# Patient Record
Sex: MALE | Race: WHITE
[De-identification: names, ages, dates, MRNs, and addresses within clinical notes are randomized per-mention and may not be internally consistent; named-entity substitution may affect disease eponyms.]

---

## 2018-04-05 ENCOUNTER — HOSPITAL ENCOUNTER (INPATIENT)
Dept: HOSPITAL 74 - YASAS | Age: 48
LOS: 4 days | Discharge: HOME | DRG: 773 | End: 2018-04-09
Attending: INTERNAL MEDICINE | Admitting: INTERNAL MEDICINE
Payer: COMMERCIAL

## 2018-04-05 VITALS — BODY MASS INDEX: 26.2 KG/M2

## 2018-04-05 DIAGNOSIS — Z91.5: ICD-10-CM

## 2018-04-05 DIAGNOSIS — F11.20: Primary | ICD-10-CM

## 2018-04-05 DIAGNOSIS — F25.9: ICD-10-CM

## 2018-04-05 DIAGNOSIS — F14.20: ICD-10-CM

## 2018-04-05 DIAGNOSIS — E86.0: ICD-10-CM

## 2018-04-05 DIAGNOSIS — F31.9: ICD-10-CM

## 2018-04-05 DIAGNOSIS — B18.2: ICD-10-CM

## 2018-04-05 DIAGNOSIS — Z86.69: ICD-10-CM

## 2018-04-05 DIAGNOSIS — F12.20: ICD-10-CM

## 2018-04-05 DIAGNOSIS — F13.230: ICD-10-CM

## 2018-04-05 DIAGNOSIS — F10.230: ICD-10-CM

## 2018-04-05 LAB
APPEARANCE UR: (no result)
BILIRUB UR STRIP.AUTO-MCNC: NEGATIVE MG/DL
COLOR UR: (no result)
EPITH CASTS URNS QL MICRO: (no result) /HPF
KETONES UR QL STRIP: NEGATIVE
LEUKOCYTE ESTERASE UR QL STRIP.AUTO: (no result)
MUCOUS THREADS URNS QL MICRO: (no result)
NITRITE UR QL STRIP: NEGATIVE
PH UR: 5 [PH] (ref 5–8)
PROT UR QL STRIP: NEGATIVE
PROT UR QL STRIP: NEGATIVE
RBC # UR STRIP: NEGATIVE /UL
SP GR UR: 1.02 (ref 1–1.03)
UROBILINOGEN UR STRIP-MCNC: (no result) MG/DL (ref 0.2–1)

## 2018-04-05 PROCEDURE — HZ2ZZZZ DETOXIFICATION SERVICES FOR SUBSTANCE ABUSE TREATMENT: ICD-10-PCS | Performed by: INTERNAL MEDICINE

## 2018-04-05 RX ADMIN — NICOTINE SCH: 14 PATCH, EXTENDED RELEASE TRANSDERMAL at 16:17

## 2018-04-05 RX ADMIN — Medication PRN MG: at 22:44

## 2018-04-05 RX ADMIN — Medication SCH MG: at 22:44

## 2018-04-05 NOTE — HP
CIWA Score





- CIWA Score


Nausea/Vomiting: 3


Muscle Tremors: 4-Moderate,w/Arms Extend


Anxiety: 3


Agitation: 3


Paroxysmal Sweats: No Perspiration


Orientation: 0-Oriented


Tacttile Disturbances: 0-None


Auditory Disturbances: 0-None


Visual Disturbances: 0-None


Headache: 0-None Present


CIWA-Ar Total Score: 13





Admission [facility]





- [facility]


Chief Complaint: 





alcohol and benzodiazepine withdrawal sx


Allergies/Adverse Reactions: 


 Allergies











Allergy/AdvReac Type Severity Reaction Status Date / Time


 


No Known Allergies Allergy   Verified 18 11:55











History of Present Illness: 





47 yo m with h/o OUD, severe on otp 205mg daily ldm today was sent for 

inaptietn detoxification form alchol and benzoidazepines because of withdrwal 

sx when he does not use and h/o seizures, no h/o DTs or ROMERO.  PMHX anxiety, 

depressio and insomna , thristy. pateit is nodding off on current methadone 

dose of 205, will decrease dose by 10% to 180 while being detoxed with 

benzoidazepiens becasue of syneergistic effect. 


Exam Limitations: No Limitations





- Ebola screening


Have you traveled outside of the country in the last 21 days: No


Have you had contact with anyone from an Ebola affected area: No


Have you been sick,other than usual withdrawal symptoms: Yes


Do you have a fever: No





- Review of Systems


Constitutional: Chills, Diaphoresis, Night Sweats, Changes in sleep, Weight 

Stable


EENT: reports: No Symptoms Reported


Respiratory: reports: No Symptoms reported


Cardiac: reports: No Symptoms Reported


GI: reports: Diarrhea, Nausea, Poor Appetite, Poor Fluid Intake, Indigestion, 

Abdominal cramping


: reports: No Symptoms Reported


Musculoskeletal: reports: No Symptoms Reported


Integumentary: reports: Flushing, Sweating


Endocrine: reports: Increased Thirst


Hematology: reports: No Symptoms Reported


Psychiatric: reports: Judgement Intact, Mood/Affect Appropiate, Orientated x3, 

Anxious, Depressed


Other Systems: Reviewed and Negative





Patient History





- Patient Medical History


Hx Anemia: No


Hx Asthma: No


Hx Chronic Obstructive Pulmonary Disease (COPD): No


Hx Cancer: No


Hx Cardiac Disorders: No


Hx Congestive Heart Failure: No


Hx Hypertension: Yes (not taking meds)


Hx Hypercholesterolemia: Yes (not taking meds)


Hx Pacemaker: No


HX Cerebrovascular Accident: No


Hx Seizures: Yes (r/t head trauma-last episode was in , withdrawal seziures 

as well)


Hx Dementia: No


Hx Diabetes: Yes (currently not on treatment)


Hx Gastrointestinal Disorders: No


Hx Liver Disease: No


Hx Genitourinary Disorders: No


Hx Sexually Transmitted Disorders: No


Hx Renal Disease (ESRD): No


Hx Thyroid Disease: No


Hx Human Immunodeficiency Virus (HIV): No


Hx Hepatitis C: Yes (untreated)


Hx Depression: Yes


Hx Suicide Attempt: Yes (cut right wrist at age 21, si at this time)


Hx Bipolar Disorder: Yes (no meds)


Hx Schizophrenia: No





- Patient Surgical History


Past Surgical History: No


Hx Neurologic Surgery: No


Hx Cataract Extraction: No


Hx Cardiac Surgery: No


Hx Lung Surgery: No


Hx Breast Surgery: No


Hx Breast Biopsy: No


Hx Abdominal Surgery: No


Hx Appendectomy: No


Hx Cholecystectomy: No


Hx Genitourinary Surgery: No


Hx  Section: No


Hx Orthopedic Surgery: No


Anesthesia Reaction: No





- PPD History


Previous Implant?: Yes


Documented Results: Negative w/o proof


Implanted On Prior [facility] Admission?: No


PPD to be Administered?: Yes





- Reproductive History


Patient is a Female of Child Bearing Age (11 -55 yrs old): No


Patient Pregnant: No





- Smoking Cessation


Smoking history: Current every day smoker


Have you smoked in the past 12 months: Yes


Aproximately how many cigarettes per day: 10


Hx Chewing Tobacco Use: No


Initiated information on smoking cessation: Yes


'Breaking Loose' booklet given: 18





- Substance & Tx. History


Hx Alcohol Use: Yes


Hx Substance Use: Yes


Substance Use Type: Alcohol, Cocaine, Heroin, Marijuana, Opiates, Prescribed, 

Tranquilizers


Hx Substance Use Treatment: Yes (MMTP< first admission to [facility])





- Substances Abused


  ** Cocaine


Route: Injection


Frequency: Daily


Amount used: $300


Age of first use: 17


Date of Last Use: 18





  ** Alcohol-vodka/rum/beer


Route: Oral


Frequency: Daily


Amount used: 3 pts./3-6 pks.


Age of first use: 17


Date of Last Use: 18





  ** Xanax


Route: Oral


Frequency: Daily


Amount used: 4 mg.


Age of first use: 46


Date of Last Use: 18





Family Disease History





- Family Disease History


Family History: Denies





Admission Physical Exam [facility]





- Vital Signs


Vital Signs: 


 Vital Signs - 24 hr











  18





  10:43


 


Temperature 97.6 F


 


Pulse Rate 72


 


Respiratory 18





Rate 


 


Blood Pressure 123/83














- Physical


General Appearance: Yes: Nourished, Appropriately Dressed, Disheveled, Mild 

Distress, Thin, Tremorous, Irritable, Sweating, Anxious


HEENTM: Yes: Within Normal Limits, EOMI, Hearing grossly Normal, Normal ENT 

Inspection, Normocephalic, Normal Voice, BARTOLOME, Pharynx Normal


Respiratory: Yes: Within Normal Limits, Chest Non-Tender, Lungs Clear, Normal 

Breath Sounds, No Respiratory Distress, No Accessory Muscle Use


Neck: Yes: Within Normal Limits, No masses,lesions,Nodules, Supple, Trachea in 

good position


Breast: Yes: Breast Exam Deferred


Cardiology: Yes: Within Normal Limits, Regular Rhythm, Regular Rate, S1, S2


Abdominal: Yes: Within Normal Limits, Normal Bowel Sounds, Non Tender, Flat, 

Soft, Increased Bowel Sounds


Genitourinary: Yes: Within Normal Limits


Back: Yes: Within Normal Limits, Normal Inspection


Musculoskeletal: Yes: full range of Motion, Gait Steady, Pelvis Stable, Back 

pain, Joint Stiffness


Extremities: Yes: Normal Capillary Refill, Normal Range of Motion, Non-Tender, 

Tremors


Neurological: Yes: CNs II-XII NML intact, Fully Oriented, Alert, Motor Strength 

5/5, Normal Response, Depressed Affect


Integumentary: Yes: Normal Color, Warm, Diaphoresis, Moist, Track Marks


Lymphatic: Yes: Within Normal Limits





- Addiitonal


Findings: 





withdrawal sx





- Diagnostic


(1) Alcohol dependence with uncomplicated withdrawal


Current Visit: Yes   Status: Acute   





(2) Sedative, hypnotic or anxiolytic dependence with withdrawal, uncomplicated


Current Visit: Yes   Status: Acute   





(3) Opioid dependence on agonist therapy


Current Visit: Yes   Status: Acute   





(4) Intravenous drug user


Current Visit: Yes   Status: Acute   





(5) Hepatitis C


Current Visit: Yes   Status: Acute   





(6) Depression


Current Visit: Yes   Status: Acute   





(7) Dehydration


Current Visit: Yes   Status: Acute   





Cleared for Admission [facility]





- Detox or Rehab


[facility] Level of Care: Medically Managed


Detox Regimen/Protocol: Valium





BHS Breath Alcohol Content


Breath Alcohol Content: 0





Urine Drug Screen





- Results


Drug Screen Negative: No


Urine Drug Screen Results: THC-Marijuana, JAVED-Cocaine, BZO-Benzodiazepines, MTD-

Methadone, TCA-Tricyclic Antidepress

## 2018-04-05 NOTE — PN
BHS Progress Note


Note: 





Psychiatric nurse practitioner note:


Writer attempted to see patient for psychiatric consultation but patient was 

sedated and unable to answer questions. Psychiatric consultation deferred.

## 2018-04-06 LAB
ALBUMIN SERPL-MCNC: 3.6 G/DL (ref 3.4–5)
ALP SERPL-CCNC: 57 U/L (ref 45–117)
ALT SERPL-CCNC: 29 U/L (ref 12–78)
ANION GAP SERPL CALC-SCNC: 7 MMOL/L (ref 8–16)
AST SERPL-CCNC: 22 U/L (ref 15–37)
BILIRUB SERPL-MCNC: 0.2 MG/DL (ref 0.2–1)
BUN SERPL-MCNC: 12 MG/DL (ref 7–18)
CALCIUM SERPL-MCNC: 8.5 MG/DL (ref 8.5–10.1)
CHLORIDE SERPL-SCNC: 103 MMOL/L (ref 98–107)
CO2 SERPL-SCNC: 33 MMOL/L (ref 21–32)
CREAT SERPL-MCNC: 0.8 MG/DL (ref 0.7–1.3)
DEPRECATED RDW RBC AUTO: 13.1 % (ref 11.9–15.9)
GLUCOSE SERPL-MCNC: 109 MG/DL (ref 74–106)
HCT VFR BLD CALC: 39 % (ref 35.4–49)
HGB BLD-MCNC: 13.2 GM/DL (ref 11.7–16.9)
MCH RBC QN AUTO: 31.5 PG (ref 25.7–33.7)
MCHC RBC AUTO-ENTMCNC: 33.8 G/DL (ref 32–35.9)
MCV RBC: 93.2 FL (ref 80–96)
PLATELET # BLD AUTO: 178 K/MM3 (ref 134–434)
PMV BLD: 10.9 FL (ref 7.5–11.1)
POTASSIUM SERPLBLD-SCNC: 4 MMOL/L (ref 3.5–5.1)
PROT SERPL-MCNC: 6.9 G/DL (ref 6.4–8.2)
RBC # BLD AUTO: 4.19 M/MM3 (ref 4–5.6)
SODIUM SERPL-SCNC: 143 MMOL/L (ref 136–145)
WBC # BLD AUTO: 7.9 K/MM3 (ref 4–10)

## 2018-04-06 RX ADMIN — Medication SCH TAB: at 10:41

## 2018-04-06 RX ADMIN — LIDOCAINE SCH PATCH: 50 PATCH TOPICAL at 10:41

## 2018-04-06 RX ADMIN — Medication PRN MG: at 22:43

## 2018-04-06 RX ADMIN — HYDROXYZINE PAMOATE PRN MG: 25 CAPSULE ORAL at 17:38

## 2018-04-06 RX ADMIN — NICOTINE SCH MG: 14 PATCH, EXTENDED RELEASE TRANSDERMAL at 10:41

## 2018-04-06 RX ADMIN — Medication SCH: at 22:43

## 2018-04-06 RX ADMIN — Medication SCH MG: at 22:43

## 2018-04-06 RX ADMIN — METHADONE HYDROCHLORIDE SCH MG: 40 TABLET ORAL at 06:00

## 2018-04-06 NOTE — EKG
Test Reason : 

Blood Pressure : ***/*** mmHG

Vent. Rate : 062 BPM     Atrial Rate : 062 BPM

   P-R Int : 158 ms          QRS Dur : 094 ms

    QT Int : 472 ms       P-R-T Axes : 054 059 064 degrees

   QTc Int : 479 ms

 

NORMAL SINUS RHYTHM

NORMAL ECG

NO PREVIOUS ECGS AVAILABLE

Confirmed by CA MANCIA MD ([id number]) on 4/6/2018 10:07:30 AM

 

Referred By:             Confirmed By:CA MANCIA MD

## 2018-04-06 NOTE — PN
S CIWA





- CIWA Score


Nausea/Vomiting: 3


Muscle Tremors: 3


Anxiety: 3


Agitation: 1-Slight > Activity


Paroxysmal Sweats: 3


Orientation: 0-Oriented


Tacttile Disturbances: 0-None


Auditory Disturbances: 0-None


Visual Disturbances: 2-Mild Sensitivity


Headache: 3-Moderate


CIWA-Ar Total Score: 18





BHS Progress Note (SOAP)


Subjective: 





Nausea, Diarrhea, Sweating, Tremors, H/A, Anxious, Stomach Cramping, Body Aches.


Objective: 


PATIENT A & O X 3, OBSERVED AMBULATING ON UNIT. NO ACUTE DISTRESS.





04/06/18 13:27


 Vital Signs











Temperature  98.6 F   04/06/18 09:33


 


Pulse Rate  84   04/06/18 09:33


 


Respiratory Rate  18   04/06/18 09:33


 


Blood Pressure  117/74   04/06/18 09:33


 


O2 Sat by Pulse Oximetry (%)      








 Laboratory Tests











  04/05/18 04/05/18 04/06/18





  12:28 19:00 05:50


 


WBC    7.9


 


RBC    4.19


 


Hgb    13.2


 


Hct    39.0


 


MCV    93.2


 


MCH    31.5


 


MCHC    33.8


 


RDW    13.1


 


Plt Count    178


 


MPV    10.9


 


Sodium   


 


Potassium   


 


Chloride   


 


Carbon Dioxide   


 


Anion Gap   


 


BUN   


 


Creatinine   


 


Creat Clearance w eGFR   


 


POC Glucometer  118  


 


Random Glucose   


 


Calcium   


 


Total Bilirubin   


 


AST   


 


ALT   


 


Alkaline Phosphatase   


 


Total Protein   


 


Albumin   


 


Urine Color   Aleah 


 


Urine Appearance   Turbid 


 


Urine pH   5.0 


 


Ur Specific Gravity   1.024 


 


Urine Protein   Negative 


 


Urine Glucose (UA)   Negative 


 


Urine Ketones   Negative 


 


Urine Blood   Negative 


 


Urine Nitrite   Negative 


 


Urine Bilirubin   Negative 


 


Urine Urobilinogen   4.0 e.u/dl 


 


Ur Leukocyte Esterase   Trace 


 


Urine WBC (Auto)   None 


 


Urine RBC (Auto)   None 


 


Ur Epithelial Cells   Rare 


 


Urine Mucus   Many 














  04/06/18 04/06/18





  05:50 06:02


 


WBC  


 


RBC  


 


Hgb  


 


Hct  


 


MCV  


 


MCH  


 


MCHC  


 


RDW  


 


Plt Count  


 


MPV  


 


Sodium  143 


 


Potassium  4.0 


 


Chloride  103 


 


Carbon Dioxide  33 H 


 


Anion Gap  7 L 


 


BUN  12 


 


Creatinine  0.8 


 


Creat Clearance w eGFR  > 60 


 


POC Glucometer   129


 


Random Glucose  109 H 


 


Calcium  8.5 


 


Total Bilirubin  0.2 


 


AST  22 


 


ALT  29 


 


Alkaline Phosphatase  57 


 


Total Protein  6.9 


 


Albumin  3.6 


 


Urine Color  


 


Urine Appearance  


 


Urine pH  


 


Ur Specific Gravity  


 


Urine Protein  


 


Urine Glucose (UA)  


 


Urine Ketones  


 


Urine Blood  


 


Urine Nitrite  


 


Urine Bilirubin  


 


Urine Urobilinogen  


 


Ur Leukocyte Esterase  


 


Urine WBC (Auto)  


 


Urine RBC (Auto)  


 


Ur Epithelial Cells  


 


Urine Mucus  








LABS NOTED.


RPR, HIV AB RESULTS PENDING.


04/06/18 13:28





Assessment: 





04/06/18 13:27


WITHDRAWAL SYMPTOMS.


Plan: 





CONTINUE DETOX.


INCREASE DAILY PO FLUID INTAKE.


PRN IMMODIUM FOR DIARRHEA.

## 2018-04-06 NOTE — CONSULT
BHS Psychiatric Consult





- Data


Date of interview: 04/06/18


Admission source: [facility]


Identifying data: First admission to [facility] for this 49 y/o  male 

seeking detox treatment on [address] for alcohol,xanax,marihuana,cocaine and 

opiod dependence.Patient is ,a father of five,homeless,unemployed and 

supported on SSI benefits.


Substance Abuse History: Confirmed by the patient in this interview.Details in 

current BHS report : Smoking history: Current every day smoker.  Have you 

smoked in the past 12 months: Yes.  Aproximately how many cigarettes per day: 

10.  Hx Chewing Tobacco Use: No.  Initiated information on smoking cessation: 

Yes.  'Breaking Loose' booklet given: 04/05/18.  - Substance & Tx. History.  Hx 

Alcohol Use: Yes.  Hx Substance Use: Yes.  Substance Use Type: Alcohol, Cocaine

, Heroin, Marijuana, Opiates, Prescribed, Tranquilizers.  Hx Substance Use 

Treatment: Yes (MMTP< first admission to [facility]).  - Substances Abused.  ** 

Cocaine.  Route: Injection.  Frequency: Daily.  Amount used: $300.  Age of 

first use: 17.  Date of Last Use: 04/05/18.  ** Alcohol-vodka/rum/beer.  Route: 

Oral.  Frequency: Daily.  Amount used: 3 pts./3-6 pks.  Age of first use: 17.  

Date of Last Use: 04/04/18.  ** Xanax.  Route: Oral.  Frequency: Daily.  Amount 

used: 4 mg.  Age of first use: 46.  Date of Last Use: 04/05/18


Medical History: Diabetes mellitus,dyslipidemia,antecedent of head trauma and 

seizures,hepatitis C (untreated) and hypertension.No reported allergies.


Psychiatric History: Patient admits to a history of 3-5 psychiatric 

hospitalizations ([facility]).Early onset of mental 

illness (age 11).Diagnosed with Schizophrenia.Mr Park reports that he gets his 

psychiatric outpatient services at the [facility] clinic on [address] [facility] in 

the [place].Patient is maintained on a regimen of lexapro 20 mg/day + trilafon 24 

mg po bid (confirmed by pharmacy claims of 3/29/18 @ [facility]).Recently 

discharged from an inpatient psychiatric service.Three months ago,as per self-

report.Patient admits to a remote history os suicide attempt (ag 21) via self-

mutilation (wrist-cutting).Currently on methadone maintenance (205 mg/day).Dose 

has been lowered to 180 mg/day since admission to [address] [place] [address] due to oversedation.


Physical/Sexual Abuse/Trauma History: Patient denies history of abuse.


Additional Comment: Urine Drug Screen Results: THC-Marijuana, JAVED-Cocaine, BZO-

Benzodiazepines, MTD-Methadone, TCA-Tricyclic Antidepressant.Noted.





Mental Status Exam





- Mental Status Exam


Alert and Oriented to: Time, Place, Person


Cognitive Function: Grossly Intact


Patient Appearance: Disheveled


Mood: Nervous, Withdrawn


Affect: Mood Congruent, Constricted


Patient Behavior: Fatigued, Talkative, Cooperative


Speech Pattern: Clear (slow,spontaneous)


Voice Loudness: Normal


Thought Process: Goal Oriented


Thought Disorder: Not Present


Hallucinations: Denies


Suicidal Ideation: Denies


Homicidal Ideation: Denies


Insight/Judgement: Poor


Sleep: Well


Appetite: Good


Muscle strength/Tone: Normal


Gait/Station: Normal (observed ambulatiing on the unit)





Psychiatric Findings





- Problem List (Axis 1, 2,3)


(1) Schizophrenia


Current Visit: Yes   Status: Chronic   





(2) Alcohol dependence with uncomplicated withdrawal


Current Visit: Yes   Status: Acute   





(3) Opioid dependence on agonist therapy


Current Visit: Yes   Status: Acute   





(4) Sedative, hypnotic or anxiolytic dependence with withdrawal, uncomplicated


Current Visit: Yes   Status: Acute   





(5) Cannabis dependence


Current Visit: Yes   Status: Acute   





(6) Cocaine dependence


Current Visit: Yes   Status: Acute   





- Initial Treatment Plan


Initial Treatment Plan: Psychoeducation.Sleep hygiene.Detoxification in 

progress.Will hold trilafon for another 24 hours and observe.If further 

improvement of cognition in AM,will resume trilafon at 8 mg po bid + lexapro 20 

mg po daily.Discussed with the patient.Mr Park has expressed his agreement to 

this plan of care.Observation.

## 2018-04-07 RX ADMIN — Medication SCH EACH: at 22:43

## 2018-04-07 RX ADMIN — Medication SCH MG: at 22:43

## 2018-04-07 RX ADMIN — NICOTINE SCH MG: 14 PATCH, EXTENDED RELEASE TRANSDERMAL at 10:32

## 2018-04-07 RX ADMIN — LIDOCAINE SCH PATCH: 50 PATCH TOPICAL at 10:32

## 2018-04-07 RX ADMIN — Medication PRN MG: at 22:45

## 2018-04-07 RX ADMIN — METHADONE HYDROCHLORIDE SCH MG: 40 TABLET ORAL at 05:30

## 2018-04-07 RX ADMIN — Medication SCH TAB: at 10:32

## 2018-04-07 NOTE — PN
S CIWA





- CIWA Score


Nausea/Vomitin


Muscle Tremors: 3


Anxiety: 2


Agitation: 0-Normal Activity


Paroxysmal Sweats: 3


Orientation: 2-Disoriented Date<2 days


Tacttile Disturbances: 1-Very Mild Itch/Numbness


Auditory Disturbances: 0-None


Visual Disturbances: 1-Very Mild Sensitivity


Headache: 0-None Present


CIWA-Ar Total Score: 17





BHS Progress Note (SOAP)


Subjective: 





Vomiting, H/A, Stomach Cramping, Sweating, Tremors, Fatigue, Diarrhea.


Objective: 


PATIENT A & O X 2 (UNCERTAIN ABOUT CURRENT DAY / DATE). NO ACUTE DISTRESS.





18 15:00


 Vital Signs











Temperature  97.0 F L  18 13:14


 


Pulse Rate  85   18 13:14


 


Respiratory Rate  18   18 13:14


 


Blood Pressure  103/71   18 13:14


 


O2 Sat by Pulse Oximetry (%)      








 Laboratory Tests











  18





  12:15 12:28 19:00


 


WBC   


 


RBC   


 


Hgb   


 


Hct   


 


MCV   


 


MCH   


 


MCHC   


 


RDW   


 


Plt Count   


 


MPV   


 


Sodium   


 


Potassium   


 


Chloride   


 


Carbon Dioxide   


 


Anion Gap   


 


BUN   


 


Creatinine   


 


Creat Clearance w eGFR   


 


POC Glucometer   118 


 


Random Glucose   


 


Calcium   


 


Total Bilirubin   


 


AST   


 


ALT   


 


Alkaline Phosphatase   


 


Total Protein   


 


Albumin   


 


Urine Color    Aleah


 


Urine Appearance    Turbid


 


Urine pH    5.0


 


Ur Specific Gravity    1.024


 


Urine Protein    Negative


 


Urine Glucose (UA)    Negative


 


Urine Ketones    Negative


 


Urine Blood    Negative


 


Urine Nitrite    Negative


 


Urine Bilirubin    Negative


 


Urine Urobilinogen    4.0 e.u/dl


 


Ur Leukocyte Esterase    Trace


 


Urine WBC (Auto)    None


 


Urine RBC (Auto)    None


 


Ur Epithelial Cells    Rare


 


Urine Mucus    Many


 


RPR Titer   


 


HIV 1&2 Antibody Screen  Negative  


 


HIV P24 Antigen  Negative  














  18





  05:50 05:50 05:50


 


WBC  7.9  


 


RBC  4.19  


 


Hgb  13.2  


 


Hct  39.0  


 


MCV  93.2  


 


MCH  31.5  


 


MCHC  33.8  


 


RDW  13.1  


 


Plt Count  178  


 


MPV  10.9  


 


Sodium   143 


 


Potassium   4.0 


 


Chloride   103 


 


Carbon Dioxide   33 H 


 


Anion Gap   7 L 


 


BUN   12 


 


Creatinine   0.8 


 


Creat Clearance w eGFR   > 60 


 


POC Glucometer   


 


Random Glucose   109 H 


 


Calcium   8.5 


 


Total Bilirubin   0.2 


 


AST   22 


 


ALT   29 


 


Alkaline Phosphatase   57 


 


Total Protein   6.9 


 


Albumin   3.6 


 


Urine Color   


 


Urine Appearance   


 


Urine pH   


 


Ur Specific Gravity   


 


Urine Protein   


 


Urine Glucose (UA)   


 


Urine Ketones   


 


Urine Blood   


 


Urine Nitrite   


 


Urine Bilirubin   


 


Urine Urobilinogen   


 


Ur Leukocyte Esterase   


 


Urine WBC (Auto)   


 


Urine RBC (Auto)   


 


Ur Epithelial Cells   


 


Urine Mucus   


 


RPR Titer    Nonreactive


 


HIV 1&2 Antibody Screen   


 


HIV P24 Antigen   














  18





  06:02


 


WBC 


 


RBC 


 


Hgb 


 


Hct 


 


MCV 


 


MCH 


 


MCHC 


 


RDW 


 


Plt Count 


 


MPV 


 


Sodium 


 


Potassium 


 


Chloride 


 


Carbon Dioxide 


 


Anion Gap 


 


BUN 


 


Creatinine 


 


Creat Clearance w eGFR 


 


POC Glucometer  129


 


Random Glucose 


 


Calcium 


 


Total Bilirubin 


 


AST 


 


ALT 


 


Alkaline Phosphatase 


 


Total Protein 


 


Albumin 


 


Urine Color 


 


Urine Appearance 


 


Urine pH 


 


Ur Specific Gravity 


 


Urine Protein 


 


Urine Glucose (UA) 


 


Urine Ketones 


 


Urine Blood 


 


Urine Nitrite 


 


Urine Bilirubin 


 


Urine Urobilinogen 


 


Ur Leukocyte Esterase 


 


Urine WBC (Auto) 


 


Urine RBC (Auto) 


 


Ur Epithelial Cells 


 


Urine Mucus 


 


RPR Titer 


 


HIV 1&2 Antibody Screen 


 


HIV P24 Antigen 








LABS NOTED.


Assessment: 





18 15:00


WITHDRAWAL SYMPTOMS.


Plan: 





CONTINUE DETOX.


PRN TIGAN IM FOR VOMITING.


ENCOURAGE AMBULATION.


INCREASE DAILY PO FLUID INTAKE.

## 2018-04-08 RX ADMIN — LIDOCAINE SCH PATCH: 50 PATCH TOPICAL at 10:39

## 2018-04-08 RX ADMIN — Medication SCH MG: at 22:41

## 2018-04-08 RX ADMIN — HYDROXYZINE PAMOATE PRN MG: 25 CAPSULE ORAL at 15:05

## 2018-04-08 RX ADMIN — METHADONE HYDROCHLORIDE SCH MG: 40 TABLET ORAL at 05:42

## 2018-04-08 RX ADMIN — Medication SCH EACH: at 22:41

## 2018-04-08 RX ADMIN — NICOTINE SCH MG: 14 PATCH, EXTENDED RELEASE TRANSDERMAL at 10:39

## 2018-04-08 RX ADMIN — Medication SCH TAB: at 10:39

## 2018-04-08 NOTE — PN
BHS Progress Note (SOAP)


Subjective: 





Chills, sweating, back pain, stomach ache


Objective: 





04/08/18 16:07


 Last Vital Signs











Temp Pulse Resp BP Pulse Ox


 


 96 F L  80   20   101/72    


 


 04/08/18 14:35  04/08/18 14:35  04/08/18 14:35  04/08/18 14:35   








 Laboratory Tests











  04/05/18 04/05/18 04/05/18





  12:15 12:28 19:00


 


WBC   


 


RBC   


 


Hgb   


 


Hct   


 


MCV   


 


MCH   


 


MCHC   


 


RDW   


 


Plt Count   


 


MPV   


 


Sodium   


 


Potassium   


 


Chloride   


 


Carbon Dioxide   


 


Anion Gap   


 


BUN   


 


Creatinine   


 


Creat Clearance w eGFR   


 


POC Glucometer   118 


 


Random Glucose   


 


Calcium   


 


Total Bilirubin   


 


AST   


 


ALT   


 


Alkaline Phosphatase   


 


Total Protein   


 


Albumin   


 


Urine Color    Aleah


 


Urine Appearance    Turbid


 


Urine pH    5.0


 


Ur Specific Gravity    1.024


 


Urine Protein    Negative


 


Urine Glucose (UA)    Negative


 


Urine Ketones    Negative


 


Urine Blood    Negative


 


Urine Nitrite    Negative


 


Urine Bilirubin    Negative


 


Urine Urobilinogen    4.0 e.u/dl


 


Ur Leukocyte Esterase    Trace


 


Urine WBC (Auto)    None


 


Urine RBC (Auto)    None


 


Ur Epithelial Cells    Rare


 


Urine Mucus    Many


 


RPR Titer   


 


HIV 1&2 Antibody Screen  Negative  


 


HIV P24 Antigen  Negative  














  04/06/18 04/06/18 04/06/18





  05:50 05:50 05:50


 


WBC  7.9  


 


RBC  4.19  


 


Hgb  13.2  


 


Hct  39.0  


 


MCV  93.2  


 


MCH  31.5  


 


MCHC  33.8  


 


RDW  13.1  


 


Plt Count  178  


 


MPV  10.9  


 


Sodium   143 


 


Potassium   4.0 


 


Chloride   103 


 


Carbon Dioxide   33 H 


 


Anion Gap   7 L 


 


BUN   12 


 


Creatinine   0.8 


 


Creat Clearance w eGFR   > 60 


 


POC Glucometer   


 


Random Glucose   109 H 


 


Calcium   8.5 


 


Total Bilirubin   0.2 


 


AST   22 


 


ALT   29 


 


Alkaline Phosphatase   57 


 


Total Protein   6.9 


 


Albumin   3.6 


 


Urine Color   


 


Urine Appearance   


 


Urine pH   


 


Ur Specific Gravity   


 


Urine Protein   


 


Urine Glucose (UA)   


 


Urine Ketones   


 


Urine Blood   


 


Urine Nitrite   


 


Urine Bilirubin   


 


Urine Urobilinogen   


 


Ur Leukocyte Esterase   


 


Urine WBC (Auto)   


 


Urine RBC (Auto)   


 


Ur Epithelial Cells   


 


Urine Mucus   


 


RPR Titer    Nonreactive


 


HIV 1&2 Antibody Screen   


 


HIV P24 Antigen   














  04/06/18





  06:02


 


WBC 


 


RBC 


 


Hgb 


 


Hct 


 


MCV 


 


MCH 


 


MCHC 


 


RDW 


 


Plt Count 


 


MPV 


 


Sodium 


 


Potassium 


 


Chloride 


 


Carbon Dioxide 


 


Anion Gap 


 


BUN 


 


Creatinine 


 


Creat Clearance w eGFR 


 


POC Glucometer  129


 


Random Glucose 


 


Calcium 


 


Total Bilirubin 


 


AST 


 


ALT 


 


Alkaline Phosphatase 


 


Total Protein 


 


Albumin 


 


Urine Color 


 


Urine Appearance 


 


Urine pH 


 


Ur Specific Gravity 


 


Urine Protein 


 


Urine Glucose (UA) 


 


Urine Ketones 


 


Urine Blood 


 


Urine Nitrite 


 


Urine Bilirubin 


 


Urine Urobilinogen 


 


Ur Leukocyte Esterase 


 


Urine WBC (Auto) 


 


Urine RBC (Auto) 


 


Ur Epithelial Cells 


 


Urine Mucus 


 


RPR Titer 


 


HIV 1&2 Antibody Screen 


 


HIV P24 Antigen 








Labs reviewed


Assessment: 





04/08/18 16:08


Withdrawal symptoms


Plan: 





Continue detox


Encouraged to drink more water for hydration

## 2018-04-09 VITALS — SYSTOLIC BLOOD PRESSURE: 101 MMHG | HEART RATE: 67 BPM | TEMPERATURE: 96 F | DIASTOLIC BLOOD PRESSURE: 68 MMHG

## 2018-04-09 RX ADMIN — Medication SCH TAB: at 10:22

## 2018-04-09 RX ADMIN — METHADONE HYDROCHLORIDE SCH MG: 40 TABLET ORAL at 05:46

## 2018-04-09 RX ADMIN — NICOTINE SCH MG: 14 PATCH, EXTENDED RELEASE TRANSDERMAL at 10:22

## 2018-04-09 RX ADMIN — HYDROXYZINE PAMOATE PRN MG: 25 CAPSULE ORAL at 01:43

## 2018-04-09 RX ADMIN — LIDOCAINE SCH PATCH: 50 PATCH TOPICAL at 10:22

## 2018-04-09 NOTE — PN
[facility] Progress Note (SOAP)


Subjective: 





DETOX COMPLETED, ALERT O X 3. NAD.


Objective: 





04/09/18 11:56


 Vital Signs











Temperature  96.0 F L  04/09/18 09:29


 


Pulse Rate  67   04/09/18 09:29


 


Respiratory Rate  18   04/09/18 09:29


 


Blood Pressure  101/68   04/09/18 09:29


 


O2 Sat by Pulse Oximetry (%)      








 Laboratory Last Values











WBC  7.9 K/mm3 (4.0-10.0)   04/06/18  05:50    


 


RBC  4.19 M/mm3 (4.00-5.60)   04/06/18  05:50    


 


Hgb  13.2 GM/dL (11.7-16.9)   04/06/18  05:50    


 


Hct  39.0 % (35.4-49)   04/06/18  05:50    


 


MCV  93.2 fl (80-96)   04/06/18  05:50    


 


MCH  31.5 pg (25.7-33.7)   04/06/18  05:50    


 


MCHC  33.8 g/dl (32.0-35.9)   04/06/18  05:50    


 


RDW  13.1 % (11.9-15.9)   04/06/18  05:50    


 


Plt Count  178 K/MM3 (134-434)   04/06/18  05:50    


 


MPV  10.9 fl (7.5-11.1)   04/06/18  05:50    


 


Sodium  143 mmol/L (136-145)   04/06/18  05:50    


 


Potassium  4.0 mmol/L (3.5-5.1)   04/06/18  05:50    


 


Chloride  103 mmol/L ()   04/06/18  05:50    


 


Carbon Dioxide  33 mmol/L (21-32)  H  04/06/18  05:50    


 


Anion Gap  7  (8-16)  L  04/06/18  05:50    


 


BUN  12 mg/dL (7-18)   04/06/18  05:50    


 


Creatinine  0.8 mg/dL (0.7-1.3)   04/06/18  05:50    


 


Creat Clearance w eGFR  > 60  (>60)   04/06/18  05:50    


 


POC Glucometer  129 UNITS ()   04/06/18  06:02    


 


Random Glucose  109 mg/dL ()  H  04/06/18  05:50    


 


Calcium  8.5 mg/dL (8.5-10.1)   04/06/18  05:50    


 


Total Bilirubin  0.2 mg/dL (0.2-1.0)   04/06/18  05:50    


 


AST  22 U/L (15-37)   04/06/18  05:50    


 


ALT  29 U/L (12-78)   04/06/18  05:50    


 


Alkaline Phosphatase  57 U/L ()   04/06/18  05:50    


 


Total Protein  6.9 g/dl (6.4-8.2)   04/06/18  05:50    


 


Albumin  3.6 g/dl (3.4-5.0)   04/06/18  05:50    


 


Urine Color  Aleah   04/05/18  19:00    


 


Urine Appearance  Turbid   04/05/18  19:00    


 


Urine pH  5.0  (5.0-8.0)   04/05/18  19:00    


 


Ur Specific Gravity  1.024  (1.001-1.035)   04/05/18  19:00    


 


Urine Protein  Negative  (NEGATIVE)   04/05/18  19:00    


 


Urine Glucose (UA)  Negative  (NEGATIVE)   04/05/18  19:00    


 


Urine Ketones  Negative  (NEGATIVE)   04/05/18  19:00    


 


Urine Blood  Negative  (NEGATIVE)   04/05/18  19:00    


 


Urine Nitrite  Negative  (NEGATIVE)   04/05/18  19:00    


 


Urine Bilirubin  Negative  (<2.0 mg/dL)   04/05/18  19:00    


 


Urine Urobilinogen  4.0 e.u/dl mg/dL (0.2-1.0)   04/05/18  19:00    


 


Ur Leukocyte Esterase  Trace  (NEGATIVE)   04/05/18  19:00    


 


Urine WBC (Auto)  None /hpf (3-5)   04/05/18  19:00    


 


Urine RBC (Auto)  None /hpf (0-3)   04/05/18  19:00    


 


Ur Epithelial Cells  Rare /HPF (FEW)   04/05/18  19:00    


 


Urine Mucus  Many   04/05/18  19:00    


 


RPR Titer  Nonreactive  (NONREACTIVE)   04/06/18  05:50    


 


HIV 1&2 Antibody Screen  Negative   04/05/18  12:15    


 


HIV P24 Antigen  Negative   04/05/18  12:15    











Assessment: 





04/09/18 11:56


NAD








Plan: 





D/C PT TODAY

## 2018-05-24 ENCOUNTER — HOSPITAL ENCOUNTER (INPATIENT)
Dept: HOSPITAL 74 - YASAS | Age: 48
LOS: 20 days | Discharge: HOME | DRG: 772 | End: 2018-06-13
Attending: PSYCHIATRY & NEUROLOGY | Admitting: PSYCHIATRY & NEUROLOGY
Payer: COMMERCIAL

## 2018-05-24 VITALS — BODY MASS INDEX: 25 KG/M2

## 2018-05-24 DIAGNOSIS — E11.65: ICD-10-CM

## 2018-05-24 DIAGNOSIS — M79.672: ICD-10-CM

## 2018-05-24 DIAGNOSIS — F17.210: ICD-10-CM

## 2018-05-24 DIAGNOSIS — F12.20: ICD-10-CM

## 2018-05-24 DIAGNOSIS — B18.2: ICD-10-CM

## 2018-05-24 DIAGNOSIS — F20.9: ICD-10-CM

## 2018-05-24 DIAGNOSIS — F14.20: Primary | ICD-10-CM

## 2018-05-24 DIAGNOSIS — Z86.69: ICD-10-CM

## 2018-05-24 DIAGNOSIS — Z91.5: ICD-10-CM

## 2018-05-24 DIAGNOSIS — F11.20: ICD-10-CM

## 2018-05-24 PROCEDURE — HZ42ZZZ GROUP COUNSELING FOR SUBSTANCE ABUSE TREATMENT, COGNITIVE-BEHAVIORAL: ICD-10-PCS | Performed by: PSYCHIATRY & NEUROLOGY

## 2018-05-24 RX ADMIN — Medication SCH MG: at 21:32

## 2018-05-24 RX ADMIN — Medication PRN MG: at 21:34

## 2018-05-24 RX ADMIN — HYDROXYZINE PAMOATE PRN MG: 50 CAPSULE ORAL at 21:34

## 2018-05-24 NOTE — HP
Admission [facility]





- [facility]


Chief Complaint: 





I need to get my life back. 


Allergies/Adverse Reactions: 


 Allergies











Allergy/AdvReac Type Severity Reaction Status Date / Time


 


No Known Allergies Allergy   Verified 18 17:44











History of Present Illness: 





pt is a 48yr old male with a history of crack/cocaine. Pt is on MMTP program 

last dose received today with 185mg. Pending verification. 


Exam Limitations: No Limitations





- Ebola screening


Have you traveled outside of the country in the last 21 days: No


Have you had contact with anyone from an Ebola affected area: No


Have you been sick,other than usual withdrawal symptoms: No


Do you have a fever: No





- Review of Systems


Constitutional: Chills


EENT: reports: No Symptoms Reported


Respiratory: reports: No Symptoms reported


Cardiac: reports: No Symptoms Reported


GI: reports: Poor Appetite, Poor Fluid Intake


: reports: No Symptoms Reported


Integumentary: reports: Flushing, Sweating


Endocrine: reports: Excessive Sweating, Flushing, Intolerance to Cold, 

Intolerance to Heat


Hematology: reports: No Symptoms Reported


Psychiatric: reports: Judgement Intact, Mood/Affect Appropiate, Orientated x3, 

Agitated, Anxious


Other Systems: Reviewed and Negative





Patient History





- Patient Medical History


Hx Anemia: No


Hx Asthma: No


Hx Chronic Obstructive Pulmonary Disease (COPD): No


Hx Cancer: No


Hx Cardiac Disorders: No


Hx Congestive Heart Failure: No


Hx Hypertension: Yes (not taking meds)


Hx Hypercholesterolemia: Yes (not taking meds)


Hx Pacemaker: No


HX Cerebrovascular Accident: No


Hx Seizures: Yes (r/t head trauma-last episode was in , withdrawal seziures 

as well)


Hx Dementia: No


Hx Diabetes: Yes (currently not on treatment)


Hx Gastrointestinal Disorders: No


Hx Liver Disease: No


Hx Genitourinary Disorders: No


Hx Sexually Transmitted Disorders: No


Hx Renal Disease (ESRD): No


Hx Thyroid Disease: No


Hx Human Immunodeficiency Virus (HIV): No


Hx Hepatitis C: Yes (untreated)


Hx Depression: Yes


Hx Suicide Attempt: Yes (cut right wrist at age 21, si at this time)


Hx Bipolar Disorder: Yes (no meds)


Hx Schizophrenia: No





- Patient Surgical History


Past Surgical History: No


Hx Neurologic Surgery: No


Hx Cataract Extraction: No


Hx Cardiac Surgery: No


Hx Lung Surgery: No


Hx Breast Surgery: No


Hx Breast Biopsy: No


Hx Abdominal Surgery: No


Hx Appendectomy: No


Hx Cholecystectomy: No


Hx Genitourinary Surgery: No


Hx  Section: No


Hx Orthopedic Surgery: No


Anesthesia Reaction: No





- PPD History


Previous Implant?: Yes


Documented Results: Negative w/proof


Date: 18


PPD to be Administered?: No





- Reproductive History


Patient is a Female of Child Bearing Age (11 -55 yrs old): No





- Smoking Cessation


Smoking history: Current every day smoker


Have you smoked in the past 12 months: Yes


Aproximately how many cigarettes per day: 10


Hx Chewing Tobacco Use: No


Initiated information on smoking cessation: Yes


'Breaking Loose' booklet given: 18





- Substance & Tx. History


Hx Alcohol Use: No


Hx Substance Use: Yes


Substance Use Type: Cocaine


Hx Substance Use Treatment: Yes (last detox 2018)





- Substances Abused


  ** Crack


Route: Smoking


Frequency: Daily


Amount used: $50


Age of first use: 14


Date of Last Use: 18





Family Disease History





- Family Disease History


Family History: Denies





Admission Physical Exam [facility]





- Vital Signs


Vital Signs: 


 Vital Signs - 24 hr











  18





  17:17


 


Temperature 98 F


 


Pulse Rate 63


 


Respiratory 20





Rate 


 


Blood Pressure 137/88














- Physical


General Appearance: Yes: Appropriately Dressed, Moderate Distress, Sweating, 

Anxious


HEENTM: Yes: Hearing grossly Normal, Normal Voice


Respiratory: Yes: Lungs Clear, Normal Breath Sounds, No Respiratory Distress


Neck: Yes: No masses,lesions,Nodules


Breast: Yes: Within Normal Limits


Cardiology: Yes: Regular Rhythm, Regular Rate, S1, S2


Abdominal: Yes: Normal Bowel Sounds, Non Tender, Flat


Genitourinary: Yes: Within Normal Limits


Back: Yes: Normal Inspection


Musculoskeletal: Yes: full range of Motion


Extremities: Yes: Normal Capillary Refill, Tremors


Neurological: Yes: Fully Oriented, Alert, Normal Response


Integumentary: Yes: Normal Color


Lymphatic: Yes: Within Normal Limits





- Diagnostic


(1) Methadone maintenance therapy patient


Current Visit: Yes   Status: Chronic   





(2) Cannabis dependence


Current Visit: Yes   Status: Chronic   





(3) Nicotine dependence


Current Visit: Yes   Status: Chronic   


Qualifiers: 


   Nicotine product type: cigarettes   Substance use status: uncomplicated   

Qualified Code(s): F17.210 - Nicotine dependence, cigarettes, uncomplicated   





(4) Cocaine dependence


Current Visit: Yes   Status: Chronic   


Qualifiers: 


   Substance use status: uncomplicated 





(5) Hepatitis C


Current Visit: Yes   Status: Chronic   


Qualifiers: 


   Viral hepatitis chronicity: chronic   Hepatic coma status: without hepatic 

coma   Qualified Code(s): B18.2 - Chronic viral hepatitis C   





Cleared for Admission S





- Detox or Rehab


[facility] Level of Care: Medically Managed





[facility] Breath Alcohol Content


Breath Alcohol Content: 0





Urine Drug Screen





- Results


Drug Screen Negative: No


Urine Drug Screen Results: THC-Marijuana, JAVED-Cocaine, BZO-Benzodiazepines





Inpatient Rehab Admission





- Initial Determination


Are CD services needed?: Yes


Free of communicable disease: Yes


Not in need of hospitalization: Yes





- Rehab Admission Criteria


Previous failed treatment: Yes


Comorbidities: Yes


Lacks judgement: Yes

## 2018-05-25 LAB
ALBUMIN SERPL-MCNC: 3.4 G/DL (ref 3.4–5)
ALP SERPL-CCNC: 56 U/L (ref 45–117)
ALT SERPL-CCNC: 19 U/L (ref 12–78)
ANION GAP SERPL CALC-SCNC: 7 MMOL/L (ref 8–16)
AST SERPL-CCNC: 20 U/L (ref 15–37)
BILIRUB SERPL-MCNC: 0.5 MG/DL (ref 0.2–1)
BUN SERPL-MCNC: 14 MG/DL (ref 7–18)
CALCIUM SERPL-MCNC: 9 MG/DL (ref 8.5–10.1)
CHLORIDE SERPL-SCNC: 103 MMOL/L (ref 98–107)
CO2 SERPL-SCNC: 32 MMOL/L (ref 21–32)
CREAT SERPL-MCNC: 0.9 MG/DL (ref 0.7–1.3)
DEPRECATED RDW RBC AUTO: 13.4 % (ref 11.9–15.9)
GLUCOSE SERPL-MCNC: 152 MG/DL (ref 74–106)
HCT VFR BLD CALC: 40.7 % (ref 35.4–49)
HGB BLD-MCNC: 13.5 GM/DL (ref 11.7–16.9)
MCH RBC QN AUTO: 31 PG (ref 25.7–33.7)
MCHC RBC AUTO-ENTMCNC: 33.1 G/DL (ref 32–35.9)
MCV RBC: 93.5 FL (ref 80–96)
PLATELET # BLD AUTO: 161 K/MM3 (ref 134–434)
PMV BLD: 10.1 FL (ref 7.5–11.1)
POTASSIUM SERPLBLD-SCNC: 4.1 MMOL/L (ref 3.5–5.1)
PROT SERPL-MCNC: 6.7 G/DL (ref 6.4–8.2)
RBC # BLD AUTO: 4.35 M/MM3 (ref 4–5.6)
SODIUM SERPL-SCNC: 142 MMOL/L (ref 136–145)
WBC # BLD AUTO: 8.4 K/MM3 (ref 4–10)

## 2018-05-25 RX ADMIN — Medication SCH MG: at 21:16

## 2018-05-25 RX ADMIN — PERPHENAZINE SCH MG: 4 TABLET, FILM COATED ORAL at 21:15

## 2018-05-25 RX ADMIN — Medication SCH TAB: at 10:02

## 2018-05-25 RX ADMIN — NICOTINE SCH MG: 21 PATCH TRANSDERMAL at 10:02

## 2018-05-25 RX ADMIN — Medication PRN MG: at 21:16

## 2018-05-25 RX ADMIN — METHADONE HYDROCHLORIDE SCH MG: 40 TABLET ORAL at 10:01

## 2018-05-25 NOTE — EKG
Test Reason : 

Blood Pressure : ***/*** mmHG

Vent. Rate : 062 BPM     Atrial Rate : 062 BPM

   P-R Int : 190 ms          QRS Dur : 094 ms

    QT Int : 410 ms       P-R-T Axes : 067 070 076 degrees

   QTc Int : 416 ms

 

NORMAL SINUS RHYTHM

NORMAL ECG

WHEN COMPARED WITH ECG OF 05-APR-2018 15:50,

QT HAS SHORTENED

Confirmed by REJI MASON MD ([id number]) on 5/25/2018 11:27:17 AM

 

Referred By:             Confirmed By:REJI MASON MD

## 2018-05-25 NOTE — HP
Psychiatrist Admission





- Data


Date of interview: 05/25/18


Admission source: 3N


Identifying data: This is the first 5N inpatient rehabilitation admission  for 

this 48 year  male who is ,a father of five,homeless,

unemployed and supported on SSI benefits.


Medical History: Diabetes mellitus,dyslipidemia,antecedent of head trauma and 

seizures,hepatitis C (untreated) and hypertension. Smokes cigarettes 10 a day. 

On MMTP 180 mg/daily.


Psychiatric History: Patient reports was diagnosed with Schizophrenia and first 

psychiatric contact at age of 11. He admits was hospitalizaed 4-5 times (

[facility]) most recent hospitalization 3 

months ago, he is under the care of [facility] in the [place] and currently 

on trilophan 8 mg am and 16 mg po hs


Physical/Sexual Abuse/Trauma History: denies history of abuse


Vital Signs: 


 Vital Signs - 24 hr











  05/24/18 05/24/18 05/25/18





  17:17 22:21 00:30


 


Temperature 98 F 98.4 F 


 


Pulse Rate 63 57 L 


 


Respiratory 20 18 18





Rate   


 


Blood Pressure 137/88 116/79 














  05/25/18 05/25/18





  03:30 06:40


 


Temperature  98.0 F


 


Pulse Rate  52 L


 


Respiratory 18 16





Rate  


 


Blood Pressure  120/73











Allergies/Adverse Reactions: 


 Allergies











Allergy/AdvReac Type Severity Reaction Status Date / Time


 


No Known Allergies Allergy   Verified 05/24/18 17:44











Date of last physical exam: 05/24/18


Concur with the findings of this exam: Yes





- Substance Abuse/Tx History


Hx Alcohol Use: Yes


Hx Substance Use: Yes


Substance Use Type: Alcohol, Cocaine, Heroin


Hx Substance Use Treatment: Yes





Mental Status Exam





- Mental Status Exam


Alert and Oriented to: Time, Place, Person


Cognitive Function: Good


Patient Appearance: Well Groomed


Mood: Hopeful


Affect: Appropriate, Mood Congruent


Patient Behavior: Appropriate, Cooperative


Speech Pattern: Clear, Appropriate


Voice Loudness: Normal


Thought Process: Intact, Goal Oriented


Thought Disorder: Not Present


Hallucinations: Denies


Suicidal Ideation: Denies


Insight/Judgement: Fair


Appetite: Good


Muscle strength/Tone: Normal


Gait/Station: Normal





Psychiatric Findings





- Problem List (Axis 1, 2,3)


(1) Cocaine dependence


Current Visit: Yes   Status: Chronic   


Qualifiers: 


   Substance use status: uncomplicated 





(2) Methadone maintenance therapy patient


Current Visit: Yes   Status: Chronic   





(3) Nicotine dependence


Current Visit: Yes   Status: Chronic   


Qualifiers: 


   Nicotine product type: cigarettes   Substance use status: uncomplicated   

Qualified Code(s): F17.210 - Nicotine dependence, cigarettes, uncomplicated   





(4) Opioid dependence on agonist therapy


Current Visit: No   Status: Acute   





(5) Schizophrenia


Current Visit: No   Status: Chronic   





- Initial Treatment Plan


Initial Treatment Plan: Will continue current medications , monitor progress as 

needed.

## 2018-05-26 RX ADMIN — HYDROXYZINE PAMOATE PRN MG: 50 CAPSULE ORAL at 18:15

## 2018-05-26 RX ADMIN — HYDROXYZINE PAMOATE PRN MG: 50 CAPSULE ORAL at 10:17

## 2018-05-26 RX ADMIN — HYDROXYZINE PAMOATE PRN MG: 50 CAPSULE ORAL at 22:19

## 2018-05-26 RX ADMIN — PERPHENAZINE SCH MG: 4 TABLET, FILM COATED ORAL at 10:15

## 2018-05-26 RX ADMIN — METHADONE HYDROCHLORIDE SCH MG: 40 TABLET ORAL at 06:45

## 2018-05-26 RX ADMIN — PERPHENAZINE SCH MG: 4 TABLET, FILM COATED ORAL at 21:21

## 2018-05-26 RX ADMIN — IBUPROFEN PRN MG: 400 TABLET, FILM COATED ORAL at 07:19

## 2018-05-26 RX ADMIN — HYDROXYZINE PAMOATE PRN MG: 50 CAPSULE ORAL at 14:12

## 2018-05-26 RX ADMIN — Medication SCH MG: at 21:21

## 2018-05-26 RX ADMIN — Medication SCH TAB: at 10:15

## 2018-05-26 RX ADMIN — NICOTINE SCH MG: 21 PATCH TRANSDERMAL at 10:15

## 2018-05-27 RX ADMIN — Medication PRN MG: at 21:17

## 2018-05-27 RX ADMIN — METHADONE HYDROCHLORIDE SCH MG: 40 TABLET ORAL at 07:09

## 2018-05-27 RX ADMIN — Medication SCH MG: at 21:17

## 2018-05-27 RX ADMIN — PERPHENAZINE SCH MG: 4 TABLET, FILM COATED ORAL at 21:18

## 2018-05-27 RX ADMIN — PERPHENAZINE SCH MG: 4 TABLET, FILM COATED ORAL at 09:48

## 2018-05-27 RX ADMIN — NICOTINE SCH MG: 21 PATCH TRANSDERMAL at 09:49

## 2018-05-27 RX ADMIN — HYDROXYZINE PAMOATE PRN MG: 50 CAPSULE ORAL at 09:48

## 2018-05-27 RX ADMIN — HYDROXYZINE PAMOATE PRN MG: 50 CAPSULE ORAL at 21:17

## 2018-05-27 RX ADMIN — Medication SCH TAB: at 09:48

## 2018-05-28 LAB
APPEARANCE UR: CLEAR
BILIRUB UR STRIP.AUTO-MCNC: NEGATIVE MG/DL
COLOR UR: (no result)
KETONES UR QL STRIP: NEGATIVE
LEUKOCYTE ESTERASE UR QL STRIP.AUTO: (no result)
NITRITE UR QL STRIP: NEGATIVE
PH UR: 7 [PH] (ref 5–8)
PROT UR QL STRIP: NEGATIVE
PROT UR QL STRIP: NEGATIVE
SP GR UR: 1.01 (ref 1–1.03)
UROBILINOGEN UR STRIP-MCNC: NEGATIVE MG/DL (ref 0.2–1)

## 2018-05-28 RX ADMIN — Medication PRN MG: at 21:26

## 2018-05-28 RX ADMIN — NICOTINE SCH MG: 21 PATCH TRANSDERMAL at 10:42

## 2018-05-28 RX ADMIN — IBUPROFEN PRN MG: 400 TABLET, FILM COATED ORAL at 10:46

## 2018-05-28 RX ADMIN — HYDROXYZINE PAMOATE PRN MG: 50 CAPSULE ORAL at 02:38

## 2018-05-28 RX ADMIN — IBUPROFEN PRN MG: 400 TABLET, FILM COATED ORAL at 17:52

## 2018-05-28 RX ADMIN — HYDROXYZINE PAMOATE PRN MG: 50 CAPSULE ORAL at 07:23

## 2018-05-28 RX ADMIN — PERPHENAZINE SCH MG: 4 TABLET, FILM COATED ORAL at 10:40

## 2018-05-28 RX ADMIN — HYDROXYZINE PAMOATE PRN MG: 50 CAPSULE ORAL at 21:25

## 2018-05-28 RX ADMIN — Medication SCH MG: at 21:25

## 2018-05-28 RX ADMIN — METHADONE HYDROCHLORIDE SCH MG: 40 TABLET ORAL at 06:56

## 2018-05-28 RX ADMIN — PERPHENAZINE SCH MG: 4 TABLET, FILM COATED ORAL at 21:25

## 2018-05-28 RX ADMIN — Medication SCH TAB: at 10:40

## 2018-05-28 NOTE — PN
[facility] Progress Note


Note: 





Patient c/o of pain on the bottom of his left foot worsening when bearing 

weight. Denies vertigo, CP , SOB, or paresthesia. 





 


 Vital Signs











Temperature  98.2 F   05/28/18 06:33


 


Pulse Rate  68   05/28/18 06:33


 


Respiratory Rate  18   05/28/18 06:33


 


Blood Pressure  107/73   05/28/18 06:33


 


O2 Sat by Pulse Oximetry (%)      








 Laboratory Last Values











WBC  8.4 K/mm3 (4.0-10.0)   05/24/18  07:30    


 


RBC  4.35 M/mm3 (4.00-5.60)   05/24/18  07:30    


 


Hgb  13.5 GM/dL (11.7-16.9)   05/24/18  07:30    


 


Hct  40.7 % (35.4-49)   05/24/18  07:30    


 


MCV  93.5 fl (80-96)   05/24/18  07:30    


 


MCH  31.0 pg (25.7-33.7)   05/24/18  07:30    


 


MCHC  33.1 g/dl (32.0-35.9)   05/24/18  07:30    


 


RDW  13.4 % (11.9-15.9)   05/24/18  07:30    


 


Plt Count  161 K/MM3 (134-434)   05/24/18  07:30    


 


MPV  10.1 fl (7.5-11.1)   05/24/18  07:30    


 


Sodium  142 mmol/L (136-145)   05/24/18  07:30    


 


Potassium  4.1 mmol/L (3.5-5.1)   05/24/18  07:30    


 


Chloride  103 mmol/L ()   05/24/18  07:30    


 


Carbon Dioxide  32 mmol/L (21-32)   05/24/18  07:30    


 


Anion Gap  7  (8-16)  L  05/24/18  07:30    


 


BUN  14 mg/dL (7-18)   05/24/18  07:30    


 


Creatinine  0.9 mg/dL (0.7-1.3)   05/24/18  07:30    


 


Creat Clearance w eGFR  > 60  (>60)   05/24/18  07:30    


 


Random Glucose  152 mg/dL ()  H D 05/24/18  07:30    


 


Calcium  9.0 mg/dL (8.5-10.1)   05/24/18  07:30    


 


Total Bilirubin  0.5 mg/dL (0.2-1.0)  D 05/24/18  07:30    


 


AST  20 U/L (15-37)   05/24/18  07:30    


 


ALT  19 U/L (12-78)  D 05/24/18  07:30    


 


Alkaline Phosphatase  56 U/L ()   05/24/18  07:30    


 


Total Protein  6.7 g/dl (6.4-8.2)   05/24/18  07:30    


 


Albumin  3.4 g/dl (3.4-5.0)   05/24/18  07:30    


 


RPR Titer  Nonreactive  (NONREACTIVE)   05/24/18  07:30    








A/P 





Patient AOx3 in no apparent distress 


Normal HR and Rhythm No JVD, pulses present through out 


Lungs clear through out no adventitious breath sounds


+ (L) heel pain 


Skin intact, hyperpigmentation b/l lower extremities, + mild  hematoma and 

callus on the left heal





left heel pain 





Plan: 


can for ambulation to relive pressure of foot 


tylenol PRN 


cold compress PRN 


continue to monitor

## 2018-05-29 RX ADMIN — Medication SCH MG: at 21:30

## 2018-05-29 RX ADMIN — HYDROXYZINE PAMOATE PRN MG: 50 CAPSULE ORAL at 10:39

## 2018-05-29 RX ADMIN — PERPHENAZINE SCH MG: 4 TABLET, FILM COATED ORAL at 10:37

## 2018-05-29 RX ADMIN — INSULIN ASPART SCH UNITS: 100 INJECTION, SOLUTION INTRAVENOUS; SUBCUTANEOUS at 17:00

## 2018-05-29 RX ADMIN — Medication PRN MG: at 21:30

## 2018-05-29 RX ADMIN — Medication SCH TAB: at 10:37

## 2018-05-29 RX ADMIN — NICOTINE SCH MG: 21 PATCH TRANSDERMAL at 10:38

## 2018-05-29 RX ADMIN — HYDROXYZINE PAMOATE PRN MG: 50 CAPSULE ORAL at 21:30

## 2018-05-29 RX ADMIN — INSULIN ASPART SCH UNITS: 100 INJECTION, SOLUTION INTRAVENOUS; SUBCUTANEOUS at 06:14

## 2018-05-29 RX ADMIN — METHADONE HYDROCHLORIDE SCH MG: 40 TABLET ORAL at 06:10

## 2018-05-29 RX ADMIN — PERPHENAZINE SCH MG: 4 TABLET, FILM COATED ORAL at 21:30

## 2018-05-29 NOTE — PN
[facility] Progress Note


Note: 





 Vital Signs











Temperature  98.6 F   05/29/18 06:32


 


Pulse Rate  78   05/29/18 06:32


 


Respiratory Rate  16   05/29/18 06:32


 


Blood Pressure  109/81   05/29/18 06:32


 


O2 Sat by Pulse Oximetry (%)      











Patient with A1C 6.1. BGM check BIDAC. Diet Non-concentrated sweats. 


continue to monitor. 


follow up with PMD upon d/c

## 2018-05-30 RX ADMIN — Medication PRN MG: at 21:19

## 2018-05-30 RX ADMIN — Medication SCH MG: at 21:19

## 2018-05-30 RX ADMIN — INSULIN ASPART SCH: 100 INJECTION, SOLUTION INTRAVENOUS; SUBCUTANEOUS at 14:06

## 2018-05-30 RX ADMIN — INSULIN ASPART SCH UNITS: 100 INJECTION, SOLUTION INTRAVENOUS; SUBCUTANEOUS at 16:59

## 2018-05-30 RX ADMIN — PERPHENAZINE SCH MG: 4 TABLET, FILM COATED ORAL at 21:18

## 2018-05-30 RX ADMIN — METHADONE HYDROCHLORIDE SCH MG: 40 TABLET ORAL at 06:14

## 2018-05-30 RX ADMIN — Medication SCH TAB: at 10:11

## 2018-05-30 RX ADMIN — PERPHENAZINE SCH MG: 4 TABLET, FILM COATED ORAL at 10:11

## 2018-05-30 RX ADMIN — NICOTINE SCH MG: 21 PATCH TRANSDERMAL at 10:11

## 2018-05-30 RX ADMIN — HYDROXYZINE PAMOATE PRN MG: 50 CAPSULE ORAL at 21:18

## 2018-05-31 RX ADMIN — PERPHENAZINE SCH MG: 4 TABLET, FILM COATED ORAL at 10:08

## 2018-05-31 RX ADMIN — Medication SCH MG: at 21:23

## 2018-05-31 RX ADMIN — Medication SCH TAB: at 10:07

## 2018-05-31 RX ADMIN — INSULIN ASPART SCH UNITS: 100 INJECTION, SOLUTION INTRAVENOUS; SUBCUTANEOUS at 16:59

## 2018-05-31 RX ADMIN — Medication PRN MG: at 21:24

## 2018-05-31 RX ADMIN — NICOTINE SCH MG: 21 PATCH TRANSDERMAL at 10:07

## 2018-05-31 RX ADMIN — METHADONE HYDROCHLORIDE SCH MG: 40 TABLET ORAL at 06:21

## 2018-05-31 RX ADMIN — INSULIN ASPART SCH UNITS: 100 INJECTION, SOLUTION INTRAVENOUS; SUBCUTANEOUS at 07:31

## 2018-05-31 RX ADMIN — HYDROXYZINE PAMOATE PRN MG: 50 CAPSULE ORAL at 21:25

## 2018-05-31 RX ADMIN — PERPHENAZINE SCH MG: 4 TABLET, FILM COATED ORAL at 21:23

## 2018-05-31 RX ADMIN — HYDROXYZINE PAMOATE PRN MG: 50 CAPSULE ORAL at 10:08

## 2018-06-01 RX ADMIN — Medication SCH TAB: at 10:15

## 2018-06-01 RX ADMIN — Medication SCH MG: at 21:15

## 2018-06-01 RX ADMIN — Medication PRN MG: at 21:16

## 2018-06-01 RX ADMIN — NICOTINE SCH MG: 21 PATCH TRANSDERMAL at 10:16

## 2018-06-01 RX ADMIN — PERPHENAZINE SCH MG: 4 TABLET, FILM COATED ORAL at 21:15

## 2018-06-01 RX ADMIN — HYDROXYZINE PAMOATE PRN MG: 50 CAPSULE ORAL at 21:16

## 2018-06-01 RX ADMIN — PERPHENAZINE SCH MG: 4 TABLET, FILM COATED ORAL at 10:15

## 2018-06-01 RX ADMIN — METHADONE HYDROCHLORIDE SCH MG: 40 TABLET ORAL at 07:25

## 2018-06-01 RX ADMIN — INSULIN ASPART SCH UNITS: 100 INJECTION, SOLUTION INTRAVENOUS; SUBCUTANEOUS at 16:53

## 2018-06-01 RX ADMIN — INSULIN ASPART SCH: 100 INJECTION, SOLUTION INTRAVENOUS; SUBCUTANEOUS at 07:35

## 2018-06-02 RX ADMIN — METHADONE HYDROCHLORIDE SCH MG: 40 TABLET ORAL at 06:22

## 2018-06-02 RX ADMIN — HYDROXYZINE PAMOATE PRN MG: 50 CAPSULE ORAL at 21:22

## 2018-06-02 RX ADMIN — NICOTINE SCH MG: 21 PATCH TRANSDERMAL at 10:05

## 2018-06-02 RX ADMIN — PERPHENAZINE SCH MG: 4 TABLET, FILM COATED ORAL at 21:22

## 2018-06-02 RX ADMIN — INSULIN ASPART SCH UNITS: 100 INJECTION, SOLUTION INTRAVENOUS; SUBCUTANEOUS at 16:43

## 2018-06-02 RX ADMIN — Medication SCH TAB: at 10:03

## 2018-06-02 RX ADMIN — PERPHENAZINE SCH MG: 4 TABLET, FILM COATED ORAL at 10:03

## 2018-06-02 RX ADMIN — INSULIN ASPART SCH: 100 INJECTION, SOLUTION INTRAVENOUS; SUBCUTANEOUS at 06:21

## 2018-06-02 RX ADMIN — Medication SCH MG: at 21:22

## 2018-06-02 RX ADMIN — Medication PRN MG: at 21:22

## 2018-06-03 RX ADMIN — PERPHENAZINE SCH MG: 4 TABLET, FILM COATED ORAL at 21:06

## 2018-06-03 RX ADMIN — Medication SCH TAB: at 09:47

## 2018-06-03 RX ADMIN — HYDROXYZINE PAMOATE PRN MG: 50 CAPSULE ORAL at 15:10

## 2018-06-03 RX ADMIN — INSULIN ASPART SCH UNITS: 100 INJECTION, SOLUTION INTRAVENOUS; SUBCUTANEOUS at 17:11

## 2018-06-03 RX ADMIN — NICOTINE SCH MG: 21 PATCH TRANSDERMAL at 09:47

## 2018-06-03 RX ADMIN — INSULIN ASPART SCH: 100 INJECTION, SOLUTION INTRAVENOUS; SUBCUTANEOUS at 06:30

## 2018-06-03 RX ADMIN — Medication SCH MG: at 21:06

## 2018-06-03 RX ADMIN — Medication PRN MG: at 21:08

## 2018-06-03 RX ADMIN — HYDROXYZINE PAMOATE PRN MG: 50 CAPSULE ORAL at 09:47

## 2018-06-03 RX ADMIN — PERPHENAZINE SCH MG: 4 TABLET, FILM COATED ORAL at 09:47

## 2018-06-03 RX ADMIN — HYDROXYZINE PAMOATE PRN MG: 50 CAPSULE ORAL at 21:08

## 2018-06-03 RX ADMIN — METHADONE HYDROCHLORIDE SCH MG: 40 TABLET ORAL at 06:28

## 2018-06-04 RX ADMIN — PERPHENAZINE SCH MG: 4 TABLET, FILM COATED ORAL at 21:20

## 2018-06-04 RX ADMIN — HYDROXYZINE PAMOATE PRN MG: 50 CAPSULE ORAL at 21:19

## 2018-06-04 RX ADMIN — INSULIN ASPART SCH: 100 INJECTION, SOLUTION INTRAVENOUS; SUBCUTANEOUS at 07:00

## 2018-06-04 RX ADMIN — PERPHENAZINE SCH MG: 4 TABLET, FILM COATED ORAL at 10:10

## 2018-06-04 RX ADMIN — METHADONE HYDROCHLORIDE SCH MG: 40 TABLET ORAL at 07:02

## 2018-06-04 RX ADMIN — Medication SCH MG: at 21:20

## 2018-06-04 RX ADMIN — Medication PRN MG: at 21:20

## 2018-06-04 RX ADMIN — NICOTINE SCH MG: 21 PATCH TRANSDERMAL at 10:10

## 2018-06-04 RX ADMIN — HYDROXYZINE PAMOATE PRN MG: 50 CAPSULE ORAL at 10:12

## 2018-06-04 RX ADMIN — INSULIN ASPART SCH UNITS: 100 INJECTION, SOLUTION INTRAVENOUS; SUBCUTANEOUS at 17:06

## 2018-06-04 RX ADMIN — Medication SCH TAB: at 10:09

## 2018-06-05 RX ADMIN — INSULIN ASPART SCH UNITS: 100 INJECTION, SOLUTION INTRAVENOUS; SUBCUTANEOUS at 07:06

## 2018-06-05 RX ADMIN — Medication SCH TAB: at 10:15

## 2018-06-05 RX ADMIN — INSULIN ASPART SCH UNITS: 100 INJECTION, SOLUTION INTRAVENOUS; SUBCUTANEOUS at 16:35

## 2018-06-05 RX ADMIN — METHADONE HYDROCHLORIDE SCH MG: 40 TABLET ORAL at 07:18

## 2018-06-05 RX ADMIN — Medication SCH MG: at 21:30

## 2018-06-05 RX ADMIN — HYDROXYZINE PAMOATE PRN MG: 50 CAPSULE ORAL at 21:32

## 2018-06-05 RX ADMIN — HYDROXYZINE PAMOATE PRN MG: 50 CAPSULE ORAL at 10:16

## 2018-06-05 RX ADMIN — NICOTINE SCH MG: 21 PATCH TRANSDERMAL at 10:15

## 2018-06-05 RX ADMIN — PERPHENAZINE SCH MG: 4 TABLET, FILM COATED ORAL at 10:15

## 2018-06-05 RX ADMIN — PERPHENAZINE SCH MG: 4 TABLET, FILM COATED ORAL at 21:30

## 2018-06-05 RX ADMIN — Medication PRN MG: at 21:31

## 2018-06-06 RX ADMIN — INSULIN ASPART SCH: 100 INJECTION, SOLUTION INTRAVENOUS; SUBCUTANEOUS at 07:00

## 2018-06-06 RX ADMIN — METHADONE HYDROCHLORIDE SCH MG: 40 TABLET ORAL at 06:59

## 2018-06-06 RX ADMIN — Medication SCH TAB: at 09:46

## 2018-06-06 RX ADMIN — HYDROXYZINE PAMOATE PRN MG: 50 CAPSULE ORAL at 21:21

## 2018-06-06 RX ADMIN — NICOTINE SCH MG: 21 PATCH TRANSDERMAL at 09:46

## 2018-06-06 RX ADMIN — PERPHENAZINE SCH MG: 4 TABLET, FILM COATED ORAL at 09:46

## 2018-06-06 RX ADMIN — Medication PRN MG: at 21:22

## 2018-06-06 RX ADMIN — INSULIN ASPART SCH UNITS: 100 INJECTION, SOLUTION INTRAVENOUS; SUBCUTANEOUS at 16:54

## 2018-06-06 RX ADMIN — PERPHENAZINE SCH MG: 4 TABLET, FILM COATED ORAL at 21:21

## 2018-06-06 RX ADMIN — Medication SCH MG: at 21:21

## 2018-06-06 RX ADMIN — HYDROXYZINE PAMOATE PRN MG: 50 CAPSULE ORAL at 09:48

## 2018-06-06 NOTE — PN
[facility] Progress Note


Note: 





Patient presents with pain to bottom of left foot. Patient ambulates with cane. 

Has hx of DM. Patient is alert and oriented x 3. In no acute distress. Exam: 

BLE without edema. Left foot dry and intact. Efrain size intact ecchymotic area 

noted on plantar aspect. No redness or swelling noted. +tenderness. No open 

areas. Pt encouraged to follow up with PCP upon discharge and to elevated feet 

while in bed. May use wheelchair if needed to avoid pressure to foot. Continue 

to monitor clinically.

## 2018-06-07 RX ADMIN — INSULIN ASPART SCH UNITS: 100 INJECTION, SOLUTION INTRAVENOUS; SUBCUTANEOUS at 16:54

## 2018-06-07 RX ADMIN — Medication PRN MG: at 21:33

## 2018-06-07 RX ADMIN — METHADONE HYDROCHLORIDE SCH MG: 40 TABLET ORAL at 06:37

## 2018-06-07 RX ADMIN — PERPHENAZINE SCH MG: 4 TABLET, FILM COATED ORAL at 09:40

## 2018-06-07 RX ADMIN — IBUPROFEN PRN MG: 400 TABLET, FILM COATED ORAL at 17:58

## 2018-06-07 RX ADMIN — Medication SCH MG: at 21:33

## 2018-06-07 RX ADMIN — HYDROXYZINE PAMOATE PRN MG: 50 CAPSULE ORAL at 21:33

## 2018-06-07 RX ADMIN — Medication SCH TAB: at 09:40

## 2018-06-07 RX ADMIN — PERPHENAZINE SCH MG: 4 TABLET, FILM COATED ORAL at 21:33

## 2018-06-07 RX ADMIN — HYDROXYZINE PAMOATE PRN MG: 50 CAPSULE ORAL at 17:58

## 2018-06-07 RX ADMIN — NICOTINE SCH MG: 21 PATCH TRANSDERMAL at 09:40

## 2018-06-07 RX ADMIN — HYDROXYZINE PAMOATE PRN MG: 50 CAPSULE ORAL at 01:32

## 2018-06-07 RX ADMIN — INSULIN ASPART SCH UNITS: 100 INJECTION, SOLUTION INTRAVENOUS; SUBCUTANEOUS at 07:48

## 2018-06-08 RX ADMIN — HYDROXYZINE PAMOATE PRN MG: 50 CAPSULE ORAL at 17:45

## 2018-06-08 RX ADMIN — PERPHENAZINE SCH MG: 4 TABLET, FILM COATED ORAL at 10:09

## 2018-06-08 RX ADMIN — METHADONE HYDROCHLORIDE SCH MG: 40 TABLET ORAL at 07:13

## 2018-06-08 RX ADMIN — PERPHENAZINE SCH MG: 4 TABLET, FILM COATED ORAL at 21:48

## 2018-06-08 RX ADMIN — IBUPROFEN PRN MG: 400 TABLET, FILM COATED ORAL at 17:45

## 2018-06-08 RX ADMIN — NICOTINE SCH MG: 21 PATCH TRANSDERMAL at 10:09

## 2018-06-08 RX ADMIN — INSULIN ASPART SCH: 100 INJECTION, SOLUTION INTRAVENOUS; SUBCUTANEOUS at 07:13

## 2018-06-08 RX ADMIN — Medication PRN MG: at 21:48

## 2018-06-08 RX ADMIN — HYDROXYZINE PAMOATE PRN MG: 50 CAPSULE ORAL at 10:10

## 2018-06-08 RX ADMIN — INSULIN ASPART SCH: 100 INJECTION, SOLUTION INTRAVENOUS; SUBCUTANEOUS at 16:59

## 2018-06-08 RX ADMIN — HYDROXYZINE PAMOATE PRN MG: 50 CAPSULE ORAL at 21:47

## 2018-06-08 RX ADMIN — Medication SCH MG: at 21:48

## 2018-06-08 RX ADMIN — Medication SCH TAB: at 10:09

## 2018-06-09 RX ADMIN — Medication PRN MG: at 21:21

## 2018-06-09 RX ADMIN — HYDROXYZINE PAMOATE PRN MG: 50 CAPSULE ORAL at 10:19

## 2018-06-09 RX ADMIN — PERPHENAZINE SCH MG: 4 TABLET, FILM COATED ORAL at 10:17

## 2018-06-09 RX ADMIN — Medication SCH TAB: at 10:17

## 2018-06-09 RX ADMIN — INSULIN ASPART SCH: 100 INJECTION, SOLUTION INTRAVENOUS; SUBCUTANEOUS at 06:29

## 2018-06-09 RX ADMIN — INSULIN ASPART SCH: 100 INJECTION, SOLUTION INTRAVENOUS; SUBCUTANEOUS at 16:45

## 2018-06-09 RX ADMIN — NICOTINE SCH MG: 21 PATCH TRANSDERMAL at 10:18

## 2018-06-09 RX ADMIN — HYDROXYZINE PAMOATE PRN MG: 50 CAPSULE ORAL at 21:21

## 2018-06-09 RX ADMIN — PERPHENAZINE SCH MG: 4 TABLET, FILM COATED ORAL at 21:21

## 2018-06-09 RX ADMIN — METHADONE HYDROCHLORIDE SCH MG: 40 TABLET ORAL at 06:30

## 2018-06-09 RX ADMIN — Medication SCH MG: at 21:21

## 2018-06-10 RX ADMIN — Medication PRN MG: at 21:22

## 2018-06-10 RX ADMIN — INSULIN ASPART SCH: 100 INJECTION, SOLUTION INTRAVENOUS; SUBCUTANEOUS at 07:12

## 2018-06-10 RX ADMIN — PERPHENAZINE SCH MG: 4 TABLET, FILM COATED ORAL at 10:10

## 2018-06-10 RX ADMIN — METHADONE HYDROCHLORIDE SCH MG: 40 TABLET ORAL at 06:52

## 2018-06-10 RX ADMIN — INSULIN ASPART SCH: 100 INJECTION, SOLUTION INTRAVENOUS; SUBCUTANEOUS at 17:34

## 2018-06-10 RX ADMIN — HYDROXYZINE PAMOATE PRN MG: 50 CAPSULE ORAL at 10:11

## 2018-06-10 RX ADMIN — NICOTINE SCH MG: 21 PATCH TRANSDERMAL at 10:10

## 2018-06-10 RX ADMIN — Medication SCH TAB: at 10:10

## 2018-06-10 RX ADMIN — Medication SCH MG: at 21:22

## 2018-06-10 RX ADMIN — HYDROXYZINE PAMOATE PRN MG: 50 CAPSULE ORAL at 21:22

## 2018-06-10 RX ADMIN — PERPHENAZINE SCH MG: 4 TABLET, FILM COATED ORAL at 21:22

## 2018-06-10 NOTE — PN
[facility] Progress Note


Note: 





bgm 318,refused insulin,will give metformin 500 mgs po now then bid,bgm 

monitoring

## 2018-06-11 RX ADMIN — HYDROXYZINE PAMOATE PRN MG: 50 CAPSULE ORAL at 21:30

## 2018-06-11 RX ADMIN — METHADONE HYDROCHLORIDE SCH MG: 40 TABLET ORAL at 05:39

## 2018-06-11 RX ADMIN — IBUPROFEN PRN MG: 400 TABLET, FILM COATED ORAL at 21:46

## 2018-06-11 RX ADMIN — METFORMIN HYDROCHLORIDE SCH MG: 500 TABLET ORAL at 06:36

## 2018-06-11 RX ADMIN — METFORMIN HYDROCHLORIDE SCH MG: 500 TABLET ORAL at 16:33

## 2018-06-11 RX ADMIN — PERPHENAZINE SCH MG: 4 TABLET, FILM COATED ORAL at 10:08

## 2018-06-11 RX ADMIN — HYDROXYZINE PAMOATE PRN MG: 50 CAPSULE ORAL at 10:10

## 2018-06-11 RX ADMIN — Medication SCH MG: at 21:30

## 2018-06-11 RX ADMIN — NICOTINE SCH MG: 21 PATCH TRANSDERMAL at 10:08

## 2018-06-11 RX ADMIN — HYDROXYZINE PAMOATE PRN MG: 50 CAPSULE ORAL at 14:07

## 2018-06-11 RX ADMIN — Medication PRN MG: at 21:30

## 2018-06-11 RX ADMIN — INSULIN ASPART SCH: 100 INJECTION, SOLUTION INTRAVENOUS; SUBCUTANEOUS at 16:33

## 2018-06-11 RX ADMIN — Medication SCH TAB: at 10:08

## 2018-06-11 RX ADMIN — PERPHENAZINE SCH MG: 4 TABLET, FILM COATED ORAL at 21:29

## 2018-06-11 RX ADMIN — INSULIN ASPART SCH: 100 INJECTION, SOLUTION INTRAVENOUS; SUBCUTANEOUS at 06:36

## 2018-06-12 RX ADMIN — METHADONE HYDROCHLORIDE SCH MG: 40 TABLET ORAL at 06:30

## 2018-06-12 RX ADMIN — PERPHENAZINE SCH MG: 4 TABLET, FILM COATED ORAL at 21:23

## 2018-06-12 RX ADMIN — PERPHENAZINE SCH MG: 4 TABLET, FILM COATED ORAL at 10:37

## 2018-06-12 RX ADMIN — METFORMIN HYDROCHLORIDE SCH MG: 500 TABLET ORAL at 06:31

## 2018-06-12 RX ADMIN — Medication PRN MG: at 21:23

## 2018-06-12 RX ADMIN — INSULIN ASPART SCH: 100 INJECTION, SOLUTION INTRAVENOUS; SUBCUTANEOUS at 07:00

## 2018-06-12 RX ADMIN — Medication SCH MG: at 21:23

## 2018-06-12 RX ADMIN — INSULIN ASPART SCH: 100 INJECTION, SOLUTION INTRAVENOUS; SUBCUTANEOUS at 16:54

## 2018-06-12 RX ADMIN — NICOTINE SCH MG: 21 PATCH TRANSDERMAL at 10:38

## 2018-06-12 RX ADMIN — HYDROXYZINE PAMOATE PRN MG: 50 CAPSULE ORAL at 21:23

## 2018-06-12 RX ADMIN — HYDROXYZINE PAMOATE PRN MG: 50 CAPSULE ORAL at 16:53

## 2018-06-12 RX ADMIN — METFORMIN HYDROCHLORIDE SCH MG: 500 TABLET ORAL at 16:52

## 2018-06-12 RX ADMIN — Medication SCH TAB: at 10:37

## 2018-06-13 VITALS — DIASTOLIC BLOOD PRESSURE: 74 MMHG | HEART RATE: 75 BPM | TEMPERATURE: 98.3 F | SYSTOLIC BLOOD PRESSURE: 107 MMHG

## 2018-06-13 RX ADMIN — INSULIN ASPART SCH: 100 INJECTION, SOLUTION INTRAVENOUS; SUBCUTANEOUS at 07:17

## 2018-06-13 RX ADMIN — PERPHENAZINE SCH MG: 4 TABLET, FILM COATED ORAL at 09:52

## 2018-06-13 RX ADMIN — METFORMIN HYDROCHLORIDE SCH: 500 TABLET ORAL at 07:17

## 2018-06-13 RX ADMIN — NICOTINE SCH: 21 PATCH TRANSDERMAL at 09:53

## 2018-06-13 RX ADMIN — Medication SCH TAB: at 09:52

## 2018-06-13 NOTE — PN
Psychiatric Progress Note


Vital Signs: 


 Vital Signs











 Period  Temp  Pulse  Resp  BP Sys/Basilio  Pulse Ox


 


 Last 24 Hr  98.3 F  75  18-18  107/74  











Date of Session: 06/13/18


Chief Complaint:: "Discharge"


HPI: Patient was admitted to  for marijuana and cocaine dependence.


ROS: Diabetes mellitus,dyslipidemia,antecedent of head trauma and seizures,

hepatitis C (untreated) and hypertension.


Current Medications: 


Active Medications











Generic Name Dose Route Start Last Admin





  Trade Name Freq  PRN Reason Stop Dose Admin


 


Acetaminophen  650 mg  05/24/18 19:09  





  Tylenol -  PO   





  Q4H PRN   





  FEVER   





     





     





     


 


Al Hydroxide/Mg Hydroxide  30 ml  05/24/18 19:07  





  Mylanta Oral Suspension -  PO   





  Q6H PRN   





  DYSPEPSIA   





     





     





     


 


Eucalyptus/Menthol/Phenol/Sorbitol  1 each  05/24/18 19:11  





  Cepastat Lozenge -  MM   





  Q4H PRN   





  SORE THROAT   





     





     





     


 


Guaifenesin  10 ml  05/24/18 19:07  





  Robitussin Dm -  PO   





  Q6H PRN   





  COUGH   





     





     





     


 


Hydroxyzine Pamoate  50 mg  05/24/18 19:07  06/12/18 21:23





  Vistaril -  PO   50 mg





  Q4H PRN   Administration





  AGITATION   





     





     





     


 


Ibuprofen  400 mg  05/24/18 19:10  06/11/18 21:46





  Motrin -  PO   400 mg





  Q6H PRN   Administration





  Pain level 4-6   





     





     





     


 


Insulin Aspart  1 vial  05/29/18 07:00  06/13/18 07:17





  Novolog Vial Sliding Scale -  SQ   Not Given





  BIDAC [facility]   





     





     





  Protocol   





     


 


Loperamide HCl  4 mg  05/24/18 19:07  





  Imodium -  PO   





  Q6H PRN   





  DIARRHEA   





     





     





     


 


Magnesium Citrate  300 ml  05/24/18 19:07  





  Citroma -  PO   





  Q48H PRN   





  CONSTIPATION   





     





     





     


 


Magnesium Hydroxide  30 ml  05/24/18 19:07  





  Milk Of Magnesia -  PO   





  DAILY PRN   





  CONSTIPATION   





     





     





     


 


Melatonin  5 mg  05/24/18 22:00  06/12/18 21:23





  Melatonin  PO   5 mg





  HS PRN   Administration





  INSOMNIA   





     





     





     


 


Metformin HCl  500 mg  06/11/18 07:00  06/13/18 07:17





  Glucophage -  PO   Not Given





  BID@0700,1630 RANDY   





     





     





     





     


 


Methadone HCl 160 mg/  185 mg  06/13/18 06:00  06/13/18 06:57





Methadone HCl 20 mg/ Methadone  PO  06/19/18 05:59  185 mg





HCl 5 mg  DAILY@0600 RANDY   Administration





     





     





     





     


 


Nicotine  21 mg  05/25/18 10:00  06/13/18 09:53





  Nicoderm Patch -  TD   Not Given





  DAILY RANDY   





     





     





     





     


 


Nicotine Polacrilex  4 mg  05/24/18 19:07  





  Nicorette Gum -  BUC   





  Q2H PRN   





  NICOTINE REPLACEMENT RX   





     





     





     


 


Perphenazine  8 mg  05/26/18 10:00  06/13/18 09:52





  Trilafon  PO   8 mg





  DAILY RANDY   Administration





     





     





     





     


 


Perphenazine  16 mg  05/25/18 22:00  06/12/18 21:23





  Trilafon  PO   16 mg





  HS RANDY   Administration





     





     





     





     


 


Prenatal Multivit/Folic Acid/Iron  1 tab  05/25/18 10:00  06/13/18 09:52





  Prenatal Vitamins (Sjr) -  PO   1 tab





  DAILY RANDY   Administration





     





     





     





     


 


Pseudoephedrine/Triprolidine  1 combo  05/24/18 19:07  





  Actifed -  PO   





  TID PRN   





  NASAL CONGESTION   





     





     





     


 


Thiamine HCl  100 mg  05/24/18 22:00  06/12/18 21:23





  Vitamin B1 -  PO   100 mg





  HS RANDY   Administration





     





     





     





     











Medication(s) Change(s): No.


Current Side Effect: No


Lab tests ordered: No


Lab tests reviewed: Yes


Provider note:: Patient will be given an early discharge on 6/13/18. Pt. has 

met his  treatment goals and will continue to address his issues at the 

[facility] methadone program. Patient is able to identify behaviors which 

contribute to relapse and has developed skills he can utilize to maintain 

recovery. Patient reports finding Trilafon 8mg PO daily + Trilafon 16mg qhs 

effective. An electronic prescription for 30 days was sent to [facility] at [address] [place] [address], [facility]. Pt. is stable for discharge.


Total face to face time:: 35





Mental Status Exam





- Mental Status Exam


Alert and Oriented to: Time, Place, Person


Cognitive Function: Good


Patient Appearance: Well Groomed


Mood: Hopeful, Euthymic


Affect: Mood Congruent


Patient Behavior: Appropriate


Speech Pattern: Appropriate


Voice Loudness: Normal


Thought Process: Intact, Goal Oriented


Thought Disorder: Not Present


Hallucinations: Denies


Suicidal Ideation: Denies


Homicidal Ideation: Denies


Insight/Judgement: Good


Sleep: Well


Appetite: Good


Muscle strength/Tone: Normal


Gait/Station: Normal





Psychiatric Treatment Plan





- Problem List


(1) Cocaine dependence


Current Visit: Yes   


Qualifiers: 


   Substance use status: uncomplicated 





(2) Methadone maintenance therapy patient


Current Visit: Yes   





(3) Nicotine dependence


Current Visit: Yes   


Qualifiers: 


   Nicotine product type: cigarettes   Substance use status: uncomplicated   

Qualified Code(s): F17.210 - Nicotine dependence, cigarettes, uncomplicated   





(4) Opioid dependence on agonist therapy


Current Visit: No   





(5) Schizophrenia


Current Visit: Yes